# Patient Record
Sex: FEMALE | Race: WHITE
[De-identification: names, ages, dates, MRNs, and addresses within clinical notes are randomized per-mention and may not be internally consistent; named-entity substitution may affect disease eponyms.]

---

## 2018-03-12 ENCOUNTER — HOSPITAL ENCOUNTER (INPATIENT)
Dept: HOSPITAL 17 - H2EB | Age: 38
LOS: 3 days | Discharge: HOME | End: 2018-03-15
Attending: OBSTETRICS & GYNECOLOGY | Admitting: OBSTETRICS & GYNECOLOGY
Payer: COMMERCIAL

## 2018-03-12 VITALS — RESPIRATION RATE: 18 BRPM | TEMPERATURE: 97.7 F

## 2018-03-12 VITALS — WEIGHT: 218.26 LBS | HEIGHT: 66 IN | BODY MASS INDEX: 35.08 KG/M2

## 2018-03-12 VITALS — DIASTOLIC BLOOD PRESSURE: 92 MMHG | HEART RATE: 68 BPM | SYSTOLIC BLOOD PRESSURE: 109 MMHG

## 2018-03-12 VITALS — SYSTOLIC BLOOD PRESSURE: 112 MMHG | DIASTOLIC BLOOD PRESSURE: 56 MMHG | HEART RATE: 67 BPM

## 2018-03-12 VITALS — RESPIRATION RATE: 18 BRPM | TEMPERATURE: 97.9 F

## 2018-03-12 VITALS — HEART RATE: 73 BPM | SYSTOLIC BLOOD PRESSURE: 127 MMHG | DIASTOLIC BLOOD PRESSURE: 68 MMHG

## 2018-03-12 VITALS — SYSTOLIC BLOOD PRESSURE: 124 MMHG | HEART RATE: 66 BPM | DIASTOLIC BLOOD PRESSURE: 75 MMHG

## 2018-03-12 VITALS — TEMPERATURE: 97.8 F | RESPIRATION RATE: 18 BRPM

## 2018-03-12 VITALS — HEART RATE: 65 BPM | DIASTOLIC BLOOD PRESSURE: 61 MMHG | SYSTOLIC BLOOD PRESSURE: 108 MMHG

## 2018-03-12 VITALS — RESPIRATION RATE: 18 BRPM

## 2018-03-12 VITALS — TEMPERATURE: 98 F

## 2018-03-12 DIAGNOSIS — O48.0: ICD-10-CM

## 2018-03-12 DIAGNOSIS — D64.9: ICD-10-CM

## 2018-03-12 DIAGNOSIS — Z3A.40: ICD-10-CM

## 2018-03-12 LAB
BACTERIA #/AREA URNS HPF: (no result) /HPF
BASOPHILS # BLD AUTO: 0 TH/MM3 (ref 0–0.2)
BASOPHILS NFR BLD: 0.5 % (ref 0–2)
COLOR UR: YELLOW
EOSINOPHIL # BLD: 0.1 TH/MM3 (ref 0–0.4)
EOSINOPHIL NFR BLD: 0.8 % (ref 0–4)
ERYTHROCYTE [DISTWIDTH] IN BLOOD BY AUTOMATED COUNT: 14.9 % (ref 11.6–17.2)
GLUCOSE UR STRIP-MCNC: (no result) MG/DL
HCT VFR BLD CALC: 33.8 % (ref 35–46)
HGB BLD-MCNC: 11.9 GM/DL (ref 11.6–15.3)
HGB UR QL STRIP: (no result)
KETONES UR STRIP-MCNC: (no result) MG/DL
LYMPHOCYTES # BLD AUTO: 2.2 TH/MM3 (ref 1–4.8)
LYMPHOCYTES NFR BLD AUTO: 31.6 % (ref 9–44)
MCH RBC QN AUTO: 31.5 PG (ref 27–34)
MCHC RBC AUTO-ENTMCNC: 35.1 % (ref 32–36)
MCV RBC AUTO: 89.6 FL (ref 80–100)
MONOCYTE #: 0.5 TH/MM3 (ref 0–0.9)
MONOCYTES NFR BLD: 6.8 % (ref 0–8)
MUCOUS THREADS #/AREA URNS LPF: (no result) /LPF
NEUTROPHILS # BLD AUTO: 4.1 TH/MM3 (ref 1.8–7.7)
NEUTROPHILS NFR BLD AUTO: 60.3 % (ref 16–70)
NITRITE UR QL STRIP: (no result)
PLATELET # BLD: 283 TH/MM3 (ref 150–450)
PMV BLD AUTO: 7.9 FL (ref 7–11)
RBC # BLD AUTO: 3.77 MIL/MM3 (ref 4–5.3)
SP GR UR STRIP: 1.02 (ref 1–1.03)
SQUAMOUS #/AREA URNS HPF: 1 /HPF (ref 0–5)
URINE LEUKOCYTE ESTERASE: (no result)
WBC # BLD AUTO: 6.9 TH/MM3 (ref 4–11)

## 2018-03-12 PROCEDURE — 85025 COMPLETE CBC W/AUTO DIFF WBC: CPT

## 2018-03-12 PROCEDURE — 86901 BLOOD TYPING SEROLOGIC RH(D): CPT

## 2018-03-12 PROCEDURE — 59025 FETAL NON-STRESS TEST: CPT

## 2018-03-12 PROCEDURE — G0481 DRUG TEST DEF 8-14 CLASSES: HCPCS

## 2018-03-12 PROCEDURE — 80307 DRUG TEST PRSMV CHEM ANLYZR: CPT

## 2018-03-12 PROCEDURE — 88307 TISSUE EXAM BY PATHOLOGIST: CPT

## 2018-03-12 PROCEDURE — 86900 BLOOD TYPING SEROLOGIC ABO: CPT

## 2018-03-12 PROCEDURE — 81001 URINALYSIS AUTO W/SCOPE: CPT

## 2018-03-12 RX ADMIN — Medication SCH ML: at 21:00

## 2018-03-12 RX ADMIN — OXYTOCIN SCH MLS/HR: 10 INJECTION, SOLUTION INTRAMUSCULAR; INTRAVENOUS at 18:49

## 2018-03-12 RX ADMIN — OXYTOCIN SCH MLS/HR: 10 INJECTION, SOLUTION INTRAMUSCULAR; INTRAVENOUS at 11:10

## 2018-03-13 VITALS — HEART RATE: 55 BPM | SYSTOLIC BLOOD PRESSURE: 116 MMHG | DIASTOLIC BLOOD PRESSURE: 63 MMHG

## 2018-03-13 VITALS — DIASTOLIC BLOOD PRESSURE: 93 MMHG | HEART RATE: 160 BPM | SYSTOLIC BLOOD PRESSURE: 145 MMHG

## 2018-03-13 VITALS — DIASTOLIC BLOOD PRESSURE: 60 MMHG | HEART RATE: 50 BPM | SYSTOLIC BLOOD PRESSURE: 113 MMHG

## 2018-03-13 VITALS — SYSTOLIC BLOOD PRESSURE: 116 MMHG | DIASTOLIC BLOOD PRESSURE: 63 MMHG | HEART RATE: 117 BPM

## 2018-03-13 VITALS — RESPIRATION RATE: 18 BRPM | HEART RATE: 85 BPM | DIASTOLIC BLOOD PRESSURE: 69 MMHG | SYSTOLIC BLOOD PRESSURE: 123 MMHG

## 2018-03-13 VITALS — RESPIRATION RATE: 18 BRPM | TEMPERATURE: 97.9 F

## 2018-03-13 VITALS — SYSTOLIC BLOOD PRESSURE: 118 MMHG | HEART RATE: 62 BPM | DIASTOLIC BLOOD PRESSURE: 101 MMHG

## 2018-03-13 VITALS — DIASTOLIC BLOOD PRESSURE: 73 MMHG | HEART RATE: 77 BPM | SYSTOLIC BLOOD PRESSURE: 109 MMHG

## 2018-03-13 VITALS — HEART RATE: 58 BPM | SYSTOLIC BLOOD PRESSURE: 105 MMHG | DIASTOLIC BLOOD PRESSURE: 57 MMHG

## 2018-03-13 VITALS — SYSTOLIC BLOOD PRESSURE: 107 MMHG | DIASTOLIC BLOOD PRESSURE: 59 MMHG | HEART RATE: 61 BPM

## 2018-03-13 VITALS — HEART RATE: 62 BPM | DIASTOLIC BLOOD PRESSURE: 47 MMHG | SYSTOLIC BLOOD PRESSURE: 106 MMHG

## 2018-03-13 VITALS — SYSTOLIC BLOOD PRESSURE: 112 MMHG | HEART RATE: 69 BPM | DIASTOLIC BLOOD PRESSURE: 63 MMHG

## 2018-03-13 VITALS — SYSTOLIC BLOOD PRESSURE: 117 MMHG | HEART RATE: 59 BPM | DIASTOLIC BLOOD PRESSURE: 69 MMHG

## 2018-03-13 VITALS — HEART RATE: 68 BPM | DIASTOLIC BLOOD PRESSURE: 63 MMHG | SYSTOLIC BLOOD PRESSURE: 106 MMHG

## 2018-03-13 VITALS — HEART RATE: 69 BPM | SYSTOLIC BLOOD PRESSURE: 113 MMHG | DIASTOLIC BLOOD PRESSURE: 72 MMHG

## 2018-03-13 VITALS — DIASTOLIC BLOOD PRESSURE: 55 MMHG | SYSTOLIC BLOOD PRESSURE: 101 MMHG | HEART RATE: 53 BPM

## 2018-03-13 VITALS — SYSTOLIC BLOOD PRESSURE: 106 MMHG | DIASTOLIC BLOOD PRESSURE: 67 MMHG | HEART RATE: 114 BPM

## 2018-03-13 VITALS — DIASTOLIC BLOOD PRESSURE: 78 MMHG | HEART RATE: 69 BPM | SYSTOLIC BLOOD PRESSURE: 108 MMHG

## 2018-03-13 VITALS — SYSTOLIC BLOOD PRESSURE: 105 MMHG | HEART RATE: 54 BPM | DIASTOLIC BLOOD PRESSURE: 64 MMHG

## 2018-03-13 VITALS — HEART RATE: 74 BPM | DIASTOLIC BLOOD PRESSURE: 81 MMHG | SYSTOLIC BLOOD PRESSURE: 133 MMHG

## 2018-03-13 VITALS — SYSTOLIC BLOOD PRESSURE: 118 MMHG | DIASTOLIC BLOOD PRESSURE: 86 MMHG

## 2018-03-13 VITALS — HEART RATE: 70 BPM | SYSTOLIC BLOOD PRESSURE: 121 MMHG | DIASTOLIC BLOOD PRESSURE: 74 MMHG

## 2018-03-13 VITALS — DIASTOLIC BLOOD PRESSURE: 55 MMHG | HEART RATE: 52 BPM | SYSTOLIC BLOOD PRESSURE: 102 MMHG

## 2018-03-13 VITALS — SYSTOLIC BLOOD PRESSURE: 116 MMHG | DIASTOLIC BLOOD PRESSURE: 76 MMHG | HEART RATE: 67 BPM

## 2018-03-13 VITALS — HEART RATE: 55 BPM | SYSTOLIC BLOOD PRESSURE: 110 MMHG | DIASTOLIC BLOOD PRESSURE: 75 MMHG

## 2018-03-13 VITALS — SYSTOLIC BLOOD PRESSURE: 119 MMHG | HEART RATE: 62 BPM | DIASTOLIC BLOOD PRESSURE: 67 MMHG

## 2018-03-13 VITALS — HEART RATE: 68 BPM | DIASTOLIC BLOOD PRESSURE: 62 MMHG | RESPIRATION RATE: 18 BRPM | SYSTOLIC BLOOD PRESSURE: 112 MMHG

## 2018-03-13 VITALS — SYSTOLIC BLOOD PRESSURE: 110 MMHG | DIASTOLIC BLOOD PRESSURE: 55 MMHG | HEART RATE: 58 BPM

## 2018-03-13 VITALS — DIASTOLIC BLOOD PRESSURE: 70 MMHG | SYSTOLIC BLOOD PRESSURE: 110 MMHG | HEART RATE: 49 BPM

## 2018-03-13 VITALS — SYSTOLIC BLOOD PRESSURE: 130 MMHG | HEART RATE: 73 BPM | DIASTOLIC BLOOD PRESSURE: 87 MMHG

## 2018-03-13 VITALS — DIASTOLIC BLOOD PRESSURE: 79 MMHG | SYSTOLIC BLOOD PRESSURE: 102 MMHG | HEART RATE: 92 BPM

## 2018-03-13 VITALS — TEMPERATURE: 98 F | RESPIRATION RATE: 18 BRPM

## 2018-03-13 VITALS — SYSTOLIC BLOOD PRESSURE: 96 MMHG | DIASTOLIC BLOOD PRESSURE: 63 MMHG | HEART RATE: 77 BPM

## 2018-03-13 VITALS — SYSTOLIC BLOOD PRESSURE: 126 MMHG | DIASTOLIC BLOOD PRESSURE: 69 MMHG | HEART RATE: 58 BPM

## 2018-03-13 VITALS — DIASTOLIC BLOOD PRESSURE: 57 MMHG | HEART RATE: 54 BPM | SYSTOLIC BLOOD PRESSURE: 105 MMHG

## 2018-03-13 VITALS — HEART RATE: 57 BPM | SYSTOLIC BLOOD PRESSURE: 114 MMHG | DIASTOLIC BLOOD PRESSURE: 51 MMHG

## 2018-03-13 VITALS — DIASTOLIC BLOOD PRESSURE: 67 MMHG | SYSTOLIC BLOOD PRESSURE: 106 MMHG | HEART RATE: 62 BPM

## 2018-03-13 VITALS — RESPIRATION RATE: 18 BRPM

## 2018-03-13 VITALS — DIASTOLIC BLOOD PRESSURE: 53 MMHG | HEART RATE: 62 BPM | SYSTOLIC BLOOD PRESSURE: 110 MMHG

## 2018-03-13 VITALS — DIASTOLIC BLOOD PRESSURE: 59 MMHG | HEART RATE: 65 BPM | SYSTOLIC BLOOD PRESSURE: 112 MMHG

## 2018-03-13 VITALS — SYSTOLIC BLOOD PRESSURE: 126 MMHG | HEART RATE: 61 BPM | DIASTOLIC BLOOD PRESSURE: 63 MMHG

## 2018-03-13 VITALS — HEART RATE: 57 BPM

## 2018-03-13 VITALS — SYSTOLIC BLOOD PRESSURE: 112 MMHG | HEART RATE: 58 BPM | DIASTOLIC BLOOD PRESSURE: 58 MMHG

## 2018-03-13 VITALS — DIASTOLIC BLOOD PRESSURE: 79 MMHG | SYSTOLIC BLOOD PRESSURE: 112 MMHG

## 2018-03-13 VITALS — SYSTOLIC BLOOD PRESSURE: 123 MMHG | HEART RATE: 55 BPM | DIASTOLIC BLOOD PRESSURE: 61 MMHG

## 2018-03-13 VITALS — RESPIRATION RATE: 18 BRPM | TEMPERATURE: 98.2 F

## 2018-03-13 VITALS — SYSTOLIC BLOOD PRESSURE: 116 MMHG | DIASTOLIC BLOOD PRESSURE: 69 MMHG | HEART RATE: 78 BPM

## 2018-03-13 VITALS — SYSTOLIC BLOOD PRESSURE: 101 MMHG | DIASTOLIC BLOOD PRESSURE: 51 MMHG | HEART RATE: 54 BPM

## 2018-03-13 VITALS — DIASTOLIC BLOOD PRESSURE: 49 MMHG | HEART RATE: 64 BPM | SYSTOLIC BLOOD PRESSURE: 104 MMHG

## 2018-03-13 VITALS — TEMPERATURE: 98.1 F | RESPIRATION RATE: 18 BRPM

## 2018-03-13 VITALS — HEART RATE: 54 BPM | DIASTOLIC BLOOD PRESSURE: 61 MMHG | SYSTOLIC BLOOD PRESSURE: 109 MMHG

## 2018-03-13 VITALS — DIASTOLIC BLOOD PRESSURE: 62 MMHG | HEART RATE: 65 BPM | SYSTOLIC BLOOD PRESSURE: 110 MMHG

## 2018-03-13 VITALS — HEART RATE: 58 BPM | DIASTOLIC BLOOD PRESSURE: 62 MMHG | SYSTOLIC BLOOD PRESSURE: 103 MMHG

## 2018-03-13 VITALS — HEART RATE: 67 BPM | SYSTOLIC BLOOD PRESSURE: 111 MMHG | DIASTOLIC BLOOD PRESSURE: 63 MMHG

## 2018-03-13 VITALS — DIASTOLIC BLOOD PRESSURE: 74 MMHG | HEART RATE: 62 BPM | SYSTOLIC BLOOD PRESSURE: 106 MMHG

## 2018-03-13 VITALS — SYSTOLIC BLOOD PRESSURE: 114 MMHG | DIASTOLIC BLOOD PRESSURE: 61 MMHG | HEART RATE: 66 BPM

## 2018-03-13 VITALS — DIASTOLIC BLOOD PRESSURE: 50 MMHG | HEART RATE: 61 BPM | SYSTOLIC BLOOD PRESSURE: 93 MMHG

## 2018-03-13 VITALS — SYSTOLIC BLOOD PRESSURE: 107 MMHG | HEART RATE: 74 BPM | DIASTOLIC BLOOD PRESSURE: 64 MMHG

## 2018-03-13 VITALS — RESPIRATION RATE: 18 BRPM | TEMPERATURE: 97.4 F

## 2018-03-13 VITALS — DIASTOLIC BLOOD PRESSURE: 61 MMHG | HEART RATE: 58 BPM | SYSTOLIC BLOOD PRESSURE: 105 MMHG

## 2018-03-13 VITALS — HEART RATE: 58 BPM

## 2018-03-13 VITALS — SYSTOLIC BLOOD PRESSURE: 121 MMHG | DIASTOLIC BLOOD PRESSURE: 63 MMHG | HEART RATE: 50 BPM

## 2018-03-13 VITALS — SYSTOLIC BLOOD PRESSURE: 119 MMHG | HEART RATE: 76 BPM | DIASTOLIC BLOOD PRESSURE: 44 MMHG

## 2018-03-13 VITALS — SYSTOLIC BLOOD PRESSURE: 123 MMHG | HEART RATE: 57 BPM | DIASTOLIC BLOOD PRESSURE: 58 MMHG

## 2018-03-13 VITALS — TEMPERATURE: 98.1 F

## 2018-03-13 VITALS
SYSTOLIC BLOOD PRESSURE: 119 MMHG | RESPIRATION RATE: 18 BRPM | TEMPERATURE: 98.6 F | DIASTOLIC BLOOD PRESSURE: 65 MMHG | HEART RATE: 72 BPM

## 2018-03-13 VITALS — HEART RATE: 59 BPM

## 2018-03-13 VITALS — DIASTOLIC BLOOD PRESSURE: 79 MMHG | HEART RATE: 52 BPM | SYSTOLIC BLOOD PRESSURE: 118 MMHG

## 2018-03-13 VITALS — TEMPERATURE: 98 F

## 2018-03-13 VITALS — DIASTOLIC BLOOD PRESSURE: 49 MMHG | HEART RATE: 56 BPM | SYSTOLIC BLOOD PRESSURE: 92 MMHG

## 2018-03-13 VITALS — HEART RATE: 80 BPM

## 2018-03-13 VITALS — HEART RATE: 72 BPM | DIASTOLIC BLOOD PRESSURE: 65 MMHG | SYSTOLIC BLOOD PRESSURE: 127 MMHG

## 2018-03-13 VITALS — HEART RATE: 86 BPM

## 2018-03-13 VITALS — RESPIRATION RATE: 18 BRPM | HEART RATE: 77 BPM

## 2018-03-13 VITALS — SYSTOLIC BLOOD PRESSURE: 112 MMHG | HEART RATE: 73 BPM | DIASTOLIC BLOOD PRESSURE: 61 MMHG

## 2018-03-13 VITALS — SYSTOLIC BLOOD PRESSURE: 113 MMHG | DIASTOLIC BLOOD PRESSURE: 63 MMHG | HEART RATE: 56 BPM

## 2018-03-13 PROCEDURE — 10907ZC DRAINAGE OF AMNIOTIC FLUID, THERAPEUTIC FROM PRODUCTS OF CONCEPTION, VIA NATURAL OR ARTIFICIAL OPENING: ICD-10-PCS | Performed by: OBSTETRICS & GYNECOLOGY

## 2018-03-13 PROCEDURE — 00HU33Z INSERTION OF INFUSION DEVICE INTO SPINAL CANAL, PERCUTANEOUS APPROACH: ICD-10-PCS

## 2018-03-13 PROCEDURE — 0KQM0ZZ REPAIR PERINEUM MUSCLE, OPEN APPROACH: ICD-10-PCS | Performed by: OBSTETRICS & GYNECOLOGY

## 2018-03-13 PROCEDURE — 3E0R3BZ INTRODUCTION OF ANESTHETIC AGENT INTO SPINAL CANAL, PERCUTANEOUS APPROACH: ICD-10-PCS

## 2018-03-13 PROCEDURE — 3E0P7VZ INTRODUCTION OF HORMONE INTO FEMALE REPRODUCTIVE, VIA NATURAL OR ARTIFICIAL OPENING: ICD-10-PCS | Performed by: OBSTETRICS & GYNECOLOGY

## 2018-03-13 RX ADMIN — Medication SCH ML: at 09:00

## 2018-03-13 RX ADMIN — OXYTOCIN SCH MLS/HR: 10 INJECTION, SOLUTION INTRAMUSCULAR; INTRAVENOUS at 10:10

## 2018-03-13 RX ADMIN — IBUPROFEN PRN MG: 800 TABLET, FILM COATED ORAL at 18:41

## 2018-03-13 RX ADMIN — STANDARDIZED SENNA CONCENTRATE AND DOCUSATE SODIUM PRN TAB: 8.6; 5 TABLET, FILM COATED ORAL at 22:23

## 2018-03-13 RX ADMIN — OXYTOCIN SCH MLS/HR: 10 INJECTION, SOLUTION INTRAMUSCULAR; INTRAVENOUS at 01:49

## 2018-03-13 NOTE — MH
cc:

RAJINDER Blevins MD, R John MD

****

 

 

DATE OF ADMISSION:

03/12/2018

 

REASON FOR ADMISSION:

1.  Induction of labor.

2.  Post dates.

 

HISTORY OF PRESENT ILLNESS:

Ms. Carrera is a 37-year-old  female, para 0-0-0-0 who has been 

followed in my office and is 40 weeks and 4 days.  Her problems are 

gestational diabetes with the sugars well controlled, no insulin, and 

she is post dates.  I put her in the hospital to start her on some 

Cytotec and consider rupture of membranes and Pitocin as soon as we 

get an adequate cervix. She understands the risks and benefits of the 

procedure and has agreed to proceed.  I am concerned because she was 

on the borderline of ____ diabetes and now she is over 40 weeks.  Her 

cervix is fairly inducible with a Bishops score of 8.

 

PAST OBSTETRICAL HISTORY:

Para 0-0-0-0.

 

PAST GYNECOLOGICAL HISTORY:

Negative.

 

PAST SURGICAL HISTORY:

Remarkable for augmentation mammoplasty.

 

PAST MEDICAL HISTORY:

Remarkable for anemia, gestational diabetes and advanced maternal age.

 

SOCIAL HISTORY:

She is .  She has never smoked.  She does not drink alcohol or 

take drugs.

 

FAMILY HISTORY:

Noncontributory.

 

ALLERGIES:

NO KNOWN DRUG ALLERGIES.

 

CURRENT MEDICATIONS:

Prenatal vitamins 1 p.o. daily and iron 1 p.o. daily.

 

REVIEW OF SYSTEMS:

No headache, no scatoma, no chest pain, no chest pressure.  No 

shortness of breath, no GI complaints.  She reports good fetal 

movement.  No rupture of membranes or bleeding, no muscle pains or 

aches.

 

PHYSICAL EXAMINATION:

GENERAL:  Well-developed well-nourished female in no acute distress.

CHEST:  Clear to auscultation and percussion.

HEART:  Regular rate and rhythm without murmur.

ABDOMEN:  Gravid, nontender.  The estimated fetal weight is 7 pounds 3

ounces.

PELVIC:  External genitalia is normal.  Vagina is clean. Cervix is 3 

cm, 60% effaced, vertex -2, soft, mid plane.

 

ASSESSMENT AND PLAN:

1.  Intrauterine pregnancy at 40 weeks and 4 days.

2.  Gestational diabetes mellitus.  Her sugars are fairly well 

controlled.  She did not need insulin, but she did have some high 

fastings which she really tried to keep under 100 and she did for the 

most part.  I do not think this baby is very big.  I think her pelvis 

is adequate.

3.  Anemia.  She has been on the iron.  We will see how that goes once

she checks in.

 

 

__________________________________

R. MD ONDINA Kapoor/SA/rh

D: 03/13/2018, 06:30 PM

T: 03/13/2018, 06:52 PM

Visit #: [id number]

Job #: [id number]

## 2018-03-13 NOTE — PD.OB.DELI
Weeks gestation:  40


Gest age assessed date:  Mar 12, 2018


Pt started active labor?:  No


Medical induction of labor?:  Yes


Medical induction start date:  Mar 12, 2018


Artificial rupture of membrane:  Yes


Artificial ROM date:  Mar 13, 2018


Artifical ROM time:  08:20


Anesthesia:  Epidural


Episiotomy:  None


Vaginal Delivery:  Normal


Presentation:  Occiput anterior


Nuchal Cord:  None


Delayed cord clamping (45 sec):  Yes


Infant:  Male


Delivery date:  Mar 13, 2018


Delivery time:  17:39


One Minute APGAR:  8


Five Minute APGAR:  9


Placenta:  Spontaneous delivery, Intact, Uterus explored +, 3 vessel cord


Laceration:  2 deg


Repair:  Vicryl running


Estimated blood loss:  300cc


Additional Information


nice delivery of John


Entire family except mom is here











RAJINDER Blevins MD Mar 13, 2018 18:19

## 2018-03-14 VITALS
TEMPERATURE: 97.6 F | RESPIRATION RATE: 20 BRPM | HEART RATE: 75 BPM | SYSTOLIC BLOOD PRESSURE: 106 MMHG | DIASTOLIC BLOOD PRESSURE: 69 MMHG

## 2018-03-14 VITALS
DIASTOLIC BLOOD PRESSURE: 72 MMHG | RESPIRATION RATE: 16 BRPM | SYSTOLIC BLOOD PRESSURE: 137 MMHG | HEART RATE: 74 BPM | TEMPERATURE: 97.8 F

## 2018-03-14 RX ADMIN — STANDARDIZED SENNA CONCENTRATE AND DOCUSATE SODIUM PRN TAB: 8.6; 5 TABLET, FILM COATED ORAL at 22:47

## 2018-03-14 RX ADMIN — OXYCODONE HYDROCHLORIDE AND ACETAMINOPHEN PRN TAB: 5; 325 TABLET ORAL at 10:35

## 2018-03-14 RX ADMIN — IBUPROFEN PRN MG: 800 TABLET, FILM COATED ORAL at 10:35

## 2018-03-14 RX ADMIN — OXYCODONE HYDROCHLORIDE AND ACETAMINOPHEN PRN TAB: 5; 325 TABLET ORAL at 22:48

## 2018-03-14 RX ADMIN — OXYCODONE HYDROCHLORIDE AND ACETAMINOPHEN PRN TAB: 5; 325 TABLET ORAL at 14:37

## 2018-03-14 RX ADMIN — IBUPROFEN PRN MG: 800 TABLET, FILM COATED ORAL at 02:52

## 2018-03-14 RX ADMIN — OXYCODONE HYDROCHLORIDE AND ACETAMINOPHEN PRN TAB: 5; 325 TABLET ORAL at 18:50

## 2018-03-14 RX ADMIN — IBUPROFEN PRN MG: 800 TABLET, FILM COATED ORAL at 18:50

## 2018-03-14 RX ADMIN — OXYCODONE HYDROCHLORIDE AND ACETAMINOPHEN PRN TAB: 5; 325 TABLET ORAL at 06:36

## 2018-03-14 RX ADMIN — OXYCODONE HYDROCHLORIDE AND ACETAMINOPHEN PRN TAB: 5; 325 TABLET ORAL at 02:52

## 2018-03-15 VITALS
HEART RATE: 67 BPM | TEMPERATURE: 98 F | SYSTOLIC BLOOD PRESSURE: 125 MMHG | RESPIRATION RATE: 20 BRPM | DIASTOLIC BLOOD PRESSURE: 82 MMHG

## 2018-03-15 RX ADMIN — STANDARDIZED SENNA CONCENTRATE AND DOCUSATE SODIUM PRN TAB: 8.6; 5 TABLET, FILM COATED ORAL at 09:04

## 2018-03-15 RX ADMIN — IBUPROFEN PRN MG: 800 TABLET, FILM COATED ORAL at 03:04

## 2018-03-15 RX ADMIN — OXYCODONE HYDROCHLORIDE AND ACETAMINOPHEN PRN TAB: 5; 325 TABLET ORAL at 06:41

## 2018-03-15 RX ADMIN — OXYCODONE HYDROCHLORIDE AND ACETAMINOPHEN PRN TAB: 5; 325 TABLET ORAL at 03:04

## 2018-03-15 RX ADMIN — IBUPROFEN PRN MG: 800 TABLET, FILM COATED ORAL at 11:24

## 2018-03-15 NOTE — HHI.DS
Admission Date


Mar 12, 2018 at 09:10


Discharge Date:  Mar 15, 2018


Admitting Diagnosis


term pregnancy


advanced maternal age


induction of labor


Diagnosis:  


(1) Advanced maternal age, 1st pregnancy


ICD Codes:  O09.519 - Supervision of elderly primigravida, unspecified trimester


(2) Vaginal delivery


ICD Codes:  O80 - Encounter for full-term uncomplicated delivery


Delivery Date:  Mar 13, 2018


Vaginal Delivery:  Normal


Infant:  Male


Brief History


term pregnancy


advanced maternal age


induction of labor





Hospital Course





routine care


Pt Condition on Discharge:  Good


Discharge Disposition:  Discharge Home


Discharge Instructions


Diet Instructions:  As Tolerated, No Restrictions


Additional Diet Instructions:  


Drink at least 8 - 16 oz bottles of water a day


Activities You Can Perform:  Shower Only-No Bath, Sitz Bath


Activities to Avoid:  Lifting/Bending, Sexual Activity


Additional Activity Instruc.:  


No driving until off pain medications





Do not lift anything heavier than your baby in an infant carrier


Follow up Referrals:  


OB/GYN - 2 Weeks @ [facility] [place]





New Medications:  


Oxycodone HCl/Acetaminophen (Oxycodone-Acetaminophen 5-325) 5 Mg-325 Mg Tablet


1 TAB PO Q4H PRN for pain 4-10, #20 TAB





 


Continued Medications:  


Prenatal Vit-Iron Carbonyl (Prenatal Plus Iron 29-1 mg) 29 Mg Iron-1 Mg Tab


1 TAB PO DAILY for Nutritional Supplement, #30 TAB 0 Refills

















Agatha Sears Mar 15, 2018 08:26

## 2018-03-15 NOTE — HHI.OB
Subjective


Post Partum Day:  2





Objective


Vitals/I&O





Vital Signs








  Date Time  Temp Pulse Resp B/P (MAP) Pulse Ox O2 Delivery O2 Flow Rate FiO2


 


3/15/18 08:00 98.0 67 20 125/82 (96)    


 


3/14/18 20:00 97.8 74 16 137/72 (93)    








Objective Remarks


GENERAL: Well-nourished, well-developed patient.


CARDIOVASCULAR: Regular rate and rhythm without murmurs, gallops, or rubs. 


RESPIRATORY: Breath sounds equal bilaterally. No accessory muscle use.


ABDOMEN/GI: Abdomen soft, non-tender. 


   Fundus: Firm, non-tender at umbilicus.


GENITOURINARY: Light to moderate bleeding.


EXTREMITIES: No cyanosis or edema, non-tender, without signs of DVT.


Medications and IVs





Current Medications








 Medications


  (Trade)  Dose


 Ordered  Sig/Mirta


 Route  Start Time


 Stop Time Status Last Admin


 


  (NS Flush)  2 ml  BID


 IV FLUSH  3/13/18 21:00


     


 


 


  (NS Flush)  2 ml  UNSCH  PRN


 IV FLUSH  3/13/18 18:15


     


 


 


  (Tylenol)  650 mg  Q4H  PRN


 PO  3/13/18 18:15


     


 


 


  (Motrin)  800 mg  Q8H  PRN


 PO  3/13/18 18:15


    3/15/18 03:04


 


 


  (Percocet  5-325


 Mg)  1 tab  Q4H  PRN


 PO  3/13/18 18:15


    3/15/18 06:41


 


 


  (Americaine 20%


 Top Spr)  1 spray  Q4H  PRN


 TOPICAL  3/13/18 18:15


    3/13/18 22:23


 


 


  (Tucks Pads)  1 applic  QID  PRN


 TOPICAL  3/13/18 18:15


    3/13/18 22:22


 


 


  (Christine-Colace)  2 tab  Q12H  PRN


 PO  3/13/18 18:15


    3/14/18 22:47


 


 


  (Ambien)  5 mg  HS  PRN


 PO  3/13/18 21:00


     


 


 


  (Mag-Al Plus


 Susp Liq)  15 ml  Q8H  PRN


 PO  3/13/18 18:15


     


 


 


  (Zofran  Odt)  4 mg  Q6H  PRN


 PO  3/13/18 18:15


     


 


 


  (Stuartnatal


 Plus 3 Prenatal)  1 tab  DAILY


 PO  3/14/18 09:00


    3/14/18 10:35


 











Assessment/Plan


Problem List:  


(1) Vaginal delivery


ICD Codes:  O80 - Encounter for full-term uncomplicated delivery


Assessment and Plan


PPD #2


Pt doing well


pain well managed with oral pain medication, using heating pad as well


encouraged stool softener


breast feeding


routine PP care


Discharge Planning


dc home today











Agatha Sears Mar 15, 2018 08:24